# Patient Record
(demographics unavailable — no encounter records)

---

## 2024-10-13 NOTE — CONSULT LETTER
[Dear  ___] : Dear  [unfilled], [Consult Letter:] : I had the pleasure of evaluating your patient, [unfilled]. [( Thank you for referring [unfilled] for consultation for _____ )] : Thank you for referring [unfilled] for consultation for [unfilled] [Please see my note below.] : Please see my note below. [Consult Closing:] : Thank you very much for allowing me to participate in the care of this patient.  If you have any questions, please do not hesitate to contact me. [Sincerely,] : Sincerely, [FreeTextEntry3] : Juma Freeman MD  Gastroenterology [facility]

## 2024-10-13 NOTE — HISTORY OF PRESENT ILLNESS
[FreeTextEntry1] : He complains of post prandial vomiting after he eats. He had an upper endoscopy last year which was normal. He denies abdominal pain. He is taking a lot of morphine and he feels this is causing his symptoms. He is in need of the morphine for his back pain.  His nephew  Adonis was in the room.

## 2024-10-13 NOTE — ASSESSMENT
[FreeTextEntry1] : Most likely he has gastroparesis secondary to heavy narcotic use.  I advised him to have multiple small meals throughout the day   Office follow up in 1 month    I spent 23 minutes with the  patient and his nephew and answered all of his questions.

## 2024-12-10 NOTE — HISTORY OF PRESENT ILLNESS
[FreeTextEntry1] : Patient presents today for multiple issues. He has left side edema. He is under the care of vascular doctor but hasn't seen him in a while and has some increase in the swelling. He has generalized neuropathy which is what causes his pain.

## 2024-12-10 NOTE — PHYSICAL EXAM
[Ankle Swelling (On Exam)] : present [Ankle Swelling On The Left] : of the left ankle [Varicose Veins Of Lower Extremities] : bilaterally [1+] : left foot dorsalis pedis 1+ [Delayed in the Right Toes] : capillary refills normal in right toes [Delayed in the Left Toes] : capillary refills normal in the left toes [FreeTextEntry1] : He has severe xerosis and hallux mycotic nails that are yellow, brittle, elongated, thick, dystrophic with subungual debris.

## 2024-12-10 NOTE — ASSESSMENT
[FreeTextEntry1] : Impression: Xerosis. Pain in foot.  Treatment: There is negative Harvinder's sign and no obvious signs of clot, but he does have neuropathy, and it is my recommendation he gets a venous duplex today and follows up with vascular. He has severe xerosis and hallux mycotic nails, which I aggressively debrided and debulked. I ePrescribed Ammonium Lactate. I discussed he is a fall risk. I gave him simple seated exercises that will be beneficial. He should use an assistive device at all times. He presents today with a family member and is going to go for the test.